# Patient Record
Sex: FEMALE | NOT HISPANIC OR LATINO | Employment: FULL TIME | URBAN - METROPOLITAN AREA
[De-identification: names, ages, dates, MRNs, and addresses within clinical notes are randomized per-mention and may not be internally consistent; named-entity substitution may affect disease eponyms.]

---

## 2019-02-11 ENCOUNTER — OFFICE VISIT (OUTPATIENT)
Dept: MEDICAL GROUP | Facility: PHYSICIAN GROUP | Age: 29
End: 2019-02-11
Payer: COMMERCIAL

## 2019-02-11 ENCOUNTER — APPOINTMENT (OUTPATIENT)
Dept: URGENT CARE | Facility: PHYSICIAN GROUP | Age: 29
End: 2019-02-11
Payer: COMMERCIAL

## 2019-02-11 ENCOUNTER — HOSPITAL ENCOUNTER (OUTPATIENT)
Dept: LAB | Facility: MEDICAL CENTER | Age: 29
End: 2019-02-11
Attending: INTERNAL MEDICINE
Payer: COMMERCIAL

## 2019-02-11 VITALS
HEART RATE: 111 BPM | TEMPERATURE: 99.4 F | OXYGEN SATURATION: 99 % | SYSTOLIC BLOOD PRESSURE: 98 MMHG | BODY MASS INDEX: 20.49 KG/M2 | WEIGHT: 120 LBS | DIASTOLIC BLOOD PRESSURE: 64 MMHG | RESPIRATION RATE: 18 BRPM | HEIGHT: 64 IN

## 2019-02-11 DIAGNOSIS — N92.6 MISSED PERIOD: ICD-10-CM

## 2019-02-11 DIAGNOSIS — R05.9 COUGH: ICD-10-CM

## 2019-02-11 LAB
B-HCG SERPL-ACNC: <0.6 MIU/ML (ref 0–5)
FLUAV+FLUBV AG SPEC QL IA: NEGATIVE
INT CON NEG: NEGATIVE
INT CON POS: POSITIVE

## 2019-02-11 PROCEDURE — 99213 OFFICE O/P EST LOW 20 MIN: CPT | Performed by: INTERNAL MEDICINE

## 2019-02-11 PROCEDURE — 87804 INFLUENZA ASSAY W/OPTIC: CPT | Performed by: INTERNAL MEDICINE

## 2019-02-11 PROCEDURE — 84702 CHORIONIC GONADOTROPIN TEST: CPT

## 2019-02-11 PROCEDURE — 36415 COLL VENOUS BLD VENIPUNCTURE: CPT

## 2019-02-11 RX ORDER — CODEINE PHOSPHATE AND GUAIFENESIN 10; 100 MG/5ML; MG/5ML
5 SOLUTION ORAL EVERY 4 HOURS PRN
Qty: 210 ML | Refills: 0 | Status: SHIPPED | OUTPATIENT
Start: 2019-02-11 | End: 2019-02-18

## 2019-02-11 NOTE — PROGRESS NOTES
"PRIMARY CARE CLINIC ACUTE VISIT  Chief Complaint   Patient presents with   • Cough     w/ sore throat    • Back Pain     Started this morning      History of Present Illness     Cough  Went to a party on Saturday where a lot of kids were sick. Has been having a dry cough. Took Nyquil on Saturday and Sunday night. Having chills but no fevers, nausea, vomiting, dysuria, hematuria. Having upper thoracic back pain starting this morning. The back pain feels like \"someone is leaning on me\". Her last period was November and her last pregnancy test was mid to last week of January. She has been evaluated by her OB.     Current Outpatient Prescriptions   Medication Sig Dispense Refill   • Pseudoeph-Doxylamine-DM-APAP (NYQUIL PO) Take  by mouth.     • guaifenesin-codeine (ROBITUSSIN AC) Solution oral solution Take 5 mL by mouth every four hours as needed for Cough for up to 7 days. 210 mL 0     No current facility-administered medications for this visit.      No past medical history on file.  No past surgical history on file.  Social History   Substance Use Topics   • Smoking status: Former Smoker     Types: Cigarettes   • Smokeless tobacco: Never Used   • Alcohol use Yes     Social History     Social History Narrative   • No narrative on file     No family history on file.  No family status information on file.     Allergies: Patient has no known allergies.    ROS  As per HPI above. All other systems reviewed and negative.        Objective   Blood pressure (!) 98/64, pulse (!) 111, temperature 37.4 °C (99.4 °F), resp. rate 18, height 1.626 m (5' 4\"), weight 54.4 kg (120 lb), last menstrual period 11/01/2018, SpO2 99 %. Body mass index is 20.6 kg/m².    General: alert and oriented, pleasant, cooperative  HEENT: Normocephalic, atraumatic. No thyroid masses. Oropharynx clear without exudate or injection.   Cardiovascular: regular rate and rhythm, normal S1/S2  Pulmonary: lungs clear to auscultation bilaterally  Lymphatics: no " cervical, submandibular, and supraclavicular lymhphadenopathy   Psychiatric: appropriate mood and affect. Good insight and appropriate judgment     Assessment and Plan   The following treatment plan was discussed     1. Cough  POCT flu swab negative. Counseled on supportive care (likely viral URI) and cheratussin for cough relief.   - POCT Influenza A/B  - guaifenesin-codeine (ROBITUSSIN AC) Solution oral solution; Take 5 mL by mouth every four hours as needed for Cough for up to 7 days.  Dispense: 210 mL; Refill: 0    2. Missed period  She hasn't had a period since November and has had negative urine pregnancy tests with her gynecologist, the most recent in late January. She would like a serum HCG checked.   - HCG QUANTITATIVE; Future      Healthcare maintenance     There are no preventive care reminders to display for this patient.    Return if symptoms worsen or fail to improve.    Abdon Ruiz MD  Internal Medicine  [facility]

## 2019-02-11 NOTE — ASSESSMENT & PLAN NOTE
"Went to a party on Saturday where a lot of kids were sick. Has been having a dry cough. Took Nyquil on Saturday and Sunday night. Having chills but no fevers, nausea, vomiting, dysuria, hematuria. Having upper thoracic back pain starting this morning. The back pain feels like \"someone is leaning on me\". Her last period was November and her last pregnancy test was mid to last week of January. She has been evaluated by her OB.   "

## 2019-02-12 ENCOUNTER — TELEPHONE (OUTPATIENT)
Dept: MEDICAL GROUP | Facility: PHYSICIAN GROUP | Age: 29
End: 2019-02-12

## 2019-02-12 NOTE — TELEPHONE ENCOUNTER
VOICEMAIL  1. Caller Name: Karey espinosa                       Call Back Number: [phone] (home)      2. Message: Wants results from labs drawn yesterday    3. Patient approves office to leave a detailed voicemail/MyChart message: N\A    Pt notified that provider hasn't had a chance to go over the labs and result them but we will call her or send a Mychart message to her once it is resulted

## 2019-02-14 ENCOUNTER — TELEPHONE (OUTPATIENT)
Dept: MEDICAL GROUP | Facility: PHYSICIAN GROUP | Age: 29
End: 2019-02-14

## 2019-02-14 NOTE — TELEPHONE ENCOUNTER
1. Caller Name: Karey espinosa                                          Call Back Number: [phone] (home)        Patient approves a detailed voicemail message: N\A    pt called stating that her Sx are not getting better. Please advise

## 2019-02-14 NOTE — TELEPHONE ENCOUNTER
IF by worse she means her cough then a viral URI can take 7-10 days total to resolve. She may try over the counter cough syrup and decongestants. If other symptoms are evolving then would recommend another assessment in the office    no

## 2019-02-14 NOTE — TELEPHONE ENCOUNTER
VOICEMAIL  1. Caller Name: Karey espinosa                       Call Back Number: [phone] (home)      2. Message: Has the flu    3. Patient approves office to leave a detailed voicemail/MyChart message: N\A      LVMTCB 02/14/19

## 2019-11-20 ENCOUNTER — OFFICE VISIT (OUTPATIENT)
Dept: URGENT CARE | Facility: PHYSICIAN GROUP | Age: 29
End: 2019-11-20
Payer: COMMERCIAL

## 2019-11-20 VITALS
HEART RATE: 94 BPM | DIASTOLIC BLOOD PRESSURE: 52 MMHG | HEIGHT: 62 IN | TEMPERATURE: 99.2 F | OXYGEN SATURATION: 96 % | RESPIRATION RATE: 16 BRPM | SYSTOLIC BLOOD PRESSURE: 100 MMHG | BODY MASS INDEX: 23.74 KG/M2 | WEIGHT: 129 LBS

## 2019-11-20 DIAGNOSIS — J98.8 RTI (RESPIRATORY TRACT INFECTION): ICD-10-CM

## 2019-11-20 DIAGNOSIS — J02.9 PHARYNGITIS, UNSPECIFIED ETIOLOGY: ICD-10-CM

## 2019-11-20 DIAGNOSIS — J32.9 RHINOSINUSITIS: ICD-10-CM

## 2019-11-20 DIAGNOSIS — R05.9 COUGH: ICD-10-CM

## 2019-11-20 PROCEDURE — 99204 OFFICE O/P NEW MOD 45 MIN: CPT | Performed by: NURSE PRACTITIONER

## 2019-11-20 RX ORDER — IBUPROFEN 200 MG
200 TABLET ORAL EVERY 6 HOURS PRN
COMMUNITY
End: 2023-03-19

## 2019-11-20 RX ORDER — BENZONATATE 100 MG/1
100 CAPSULE ORAL EVERY 8 HOURS PRN
Qty: 30 CAP | Refills: 0 | Status: SHIPPED | OUTPATIENT
Start: 2019-11-20 | End: 2023-03-19

## 2019-11-20 RX ORDER — AMOXICILLIN AND CLAVULANATE POTASSIUM 875; 125 MG/1; MG/1
1 TABLET, FILM COATED ORAL 2 TIMES DAILY
Qty: 20 TAB | Refills: 0 | Status: SHIPPED | OUTPATIENT
Start: 2019-11-20 | End: 2019-11-30

## 2019-11-20 RX ORDER — LORATADINE 10 MG/1
10 TABLET ORAL DAILY
COMMUNITY
End: 2023-03-19

## 2019-11-20 ASSESSMENT — ENCOUNTER SYMPTOMS
CHILLS: 1
SORE THROAT: 1
COUGH: 1
CARDIOVASCULAR NEGATIVE: 1
RHINORRHEA: 1
SHORTNESS OF BREATH: 0

## 2019-11-20 NOTE — PROGRESS NOTES
Subjective:     Karey Weinberg is a 29 y.o. female who presents for Cough (drainage, sore throat, x 2 weeks)       Cough   This is a new problem. Episode onset: 2 weeks ago. The problem has been gradually worsening. The cough is non-productive. Associated symptoms include chills, nasal congestion, rhinorrhea and a sore throat. Pertinent negatives include no shortness of breath. Treatments tried: Mucinex, antihistamine. Her past medical history is significant for environmental allergies.     PMH:  has no past medical history on file.    MEDS:   Current Outpatient Medications:   •  ibuprofen (MOTRIN) 200 MG Tab, Take 200 mg by mouth every 6 hours as needed., Disp: , Rfl:   •  loratadine (CLARITIN) 10 MG Tab, Take 10 mg by mouth every day., Disp: , Rfl:   •  amoxicillin-clavulanate (AUGMENTIN) 875-125 MG Tab, Take 1 Tab by mouth 2 times a day for 10 days., Disp: 20 Tab, Rfl: 0  •  benzonatate (TESSALON) 100 MG Cap, Take 1 Cap by mouth every 8 hours as needed for Cough., Disp: 30 Cap, Rfl: 0  •  Pseudoeph-Doxylamine-DM-APAP (NYQUIL PO), Take  by mouth., Disp: , Rfl:     ALLERGIES: No Known Allergies    SURGHX: History reviewed. No pertinent surgical history.    SOCHX:  reports that she has quit smoking. Her smoking use included cigarettes. She has never used smokeless tobacco. She reports current alcohol use. She reports that she does not use drugs.     FH: Reviewed with patient, not pertinent to this visit.    Review of Systems   Constitutional: Positive for chills and malaise/fatigue.   HENT: Positive for congestion, rhinorrhea and sore throat.    Respiratory: Positive for cough. Negative for shortness of breath.    Cardiovascular: Negative.    Endo/Heme/Allergies: Positive for environmental allergies.   All other systems reviewed and are negative.    Objective:     /52 (BP Location: Left arm, Patient Position: Sitting, BP Cuff Size: Adult)   Pulse 94   Temp 37.3 °C (99.2 °F) (Temporal)   Resp 16    "Ht 1.575 m (5' 2\")   Wt 58.5 kg (129 lb)   LMP 10/31/2019 (Approximate)   SpO2 96%   BMI 23.59 kg/m²     Physical Exam  Vitals signs reviewed.   Constitutional:       General: She is not in acute distress.     Appearance: She is well-developed. She is not toxic-appearing or diaphoretic.   HENT:      Head: Normocephalic.      Right Ear: Tympanic membrane and external ear normal.      Left Ear: Tympanic membrane and external ear normal.      Nose: Mucosal edema (And erythema) and rhinorrhea present.      Mouth/Throat:      Mouth: Mucous membranes are moist.      Pharynx: Uvula midline. Pharyngeal swelling and posterior oropharyngeal erythema present.   Eyes:      Extraocular Movements: Extraocular movements intact.      Conjunctiva/sclera: Conjunctivae normal.      Pupils: Pupils are equal, round, and reactive to light.   Neck:      Musculoskeletal: Normal range of motion.   Cardiovascular:      Rate and Rhythm: Normal rate and regular rhythm.      Pulses: Normal pulses.      Heart sounds: Normal heart sounds.   Pulmonary:      Effort: Pulmonary effort is normal. No respiratory distress.      Breath sounds: Normal breath sounds. No decreased breath sounds.   Abdominal:      General: Bowel sounds are normal.      Palpations: Abdomen is soft.      Tenderness: There is no tenderness.   Musculoskeletal: Normal range of motion.         General: No deformity.   Skin:     General: Skin is warm and dry.      Capillary Refill: Capillary refill takes less than 2 seconds.      Coloration: Skin is not pale.   Neurological:      Mental Status: She is alert and oriented to person, place, and time.      Sensory: No sensory deficit.      Coordination: Coordination normal.   Psychiatric:         Behavior: Behavior normal. Behavior is cooperative.          Assessment/Plan:     1. RTI (respiratory tract infection)    2. Pharyngitis, unspecified etiology    3. Cough  - benzonatate (TESSALON) 100 MG Cap; Take 1 Cap by mouth every 8 " hours as needed for Cough.  Dispense: 30 Cap; Refill: 0    4. Rhinosinusitis  - amoxicillin-clavulanate (AUGMENTIN) 875-125 MG Tab; Take 1 Tab by mouth 2 times a day for 10 days.  Dispense: 20 Tab; Refill: 0    Various differentials discussed including allergic vs viral vs bacterial etiologies.    Rx as above sent electronically.    Discussed OTC decongestants (e.g. Sudafed), antihistamines, Flonase, and nasal saline rinses/neti pot.    Discussed close monitoring and supportive measures including increasing fluids and rest as well as OTC symptom management including acetaminophen and/or ibuprofen PRN pain and/or fever.     Vital signs stable, afebrile, asymptomatic, no acute distress.    Warning signs reviewed. Return precautions discussed.     Patient advised to: Return for 1) Symptoms don't improve or worsen, or go to ER, 2) Follow up with primary care in 7-10 days.    Differential diagnosis, natural history, supportive care, and indications for immediate follow-up discussed. All questions answered. Patient agrees with the plan of care.    Billing note: patient billed as a new patient as the patient has not received any professional medical services from myself or another provider of the same specialty (family medicine) who belongs to the same group practice within the previous 3 years.

## 2019-12-20 ENCOUNTER — SUPERVISING PHYSICIAN REVIEW (OUTPATIENT)
Dept: URGENT CARE | Facility: PHYSICIAN GROUP | Age: 29
End: 2019-12-20